# Patient Record
Sex: MALE | URBAN - METROPOLITAN AREA
[De-identification: names, ages, dates, MRNs, and addresses within clinical notes are randomized per-mention and may not be internally consistent; named-entity substitution may affect disease eponyms.]

---

## 2018-08-06 ENCOUNTER — APPOINTMENT (OUTPATIENT)
Age: 43
Setting detail: DERMATOLOGY
End: 2018-08-06

## 2018-08-06 DIAGNOSIS — D22 MELANOCYTIC NEVI: ICD-10-CM

## 2018-08-06 DIAGNOSIS — L93.0 DISCOID LUPUS ERYTHEMATOSUS: ICD-10-CM

## 2018-08-06 PROBLEM — D22.62 MELANOCYTIC NEVI OF LEFT UPPER LIMB, INCLUDING SHOULDER: Status: ACTIVE | Noted: 2018-08-06

## 2018-08-06 PROCEDURE — 99202 OFFICE O/P NEW SF 15 MIN: CPT | Mod: 25

## 2018-08-06 PROCEDURE — OTHER COUNSELING: OTHER

## 2018-08-06 PROCEDURE — OTHER PRESCRIPTION: OTHER

## 2018-08-06 PROCEDURE — OTHER MIPS QUALITY: OTHER

## 2018-08-06 PROCEDURE — OTHER BIOPSY BY PUNCH METHOD: OTHER

## 2018-08-06 PROCEDURE — 11100: CPT

## 2018-08-06 RX ORDER — TRIAMCINOLONE ACETONIDE 1 MG/G
APPLY CREAM TOPICAL BID
Qty: 1 | Refills: 3 | Status: ERX | COMMUNITY
Start: 2018-08-06

## 2018-08-06 ASSESSMENT — LOCATION DETAILED DESCRIPTION DERM
LOCATION DETAILED: LEFT POSTERIOR SHOULDER
LOCATION DETAILED: RIGHT MEDIAL SUPERIOR CHEST

## 2018-08-06 ASSESSMENT — LOCATION SIMPLE DESCRIPTION DERM
LOCATION SIMPLE: LEFT SHOULDER
LOCATION SIMPLE: CHEST

## 2018-08-06 ASSESSMENT — LOCATION ZONE DERM
LOCATION ZONE: TRUNK
LOCATION ZONE: ARM

## 2018-08-06 NOTE — PROCEDURE: BIOPSY BY PUNCH METHOD
Patient Will Remove Sutures At Home?: No
Hemostasis: None
Billing Type: Third-Party Bill
Home Suture Removal Text: Patient was provided a home suture removal kit and will remove their sutures at home.  If they have any questions or difficulties they will call the office.
Anesthesia Volume In Cc (Will Not Render If 0): 0.5
X Depth Of Punch In Cm (Optional): 0
Epidermal Sutures: 6-0 Ethilon
Notification Instructions: Patient will be notified of biopsy results. However, patient instructed to call the office if not contacted within 2 weeks.
Consent: Written consent was obtained and risks were reviewed including but not limited to scarring, infection, bleeding, scabbing, incomplete removal, nerve damage and allergy to anesthesia.
Dressing: bandage
Punch Size In Mm: 2
Detail Level: Detailed
Suture Removal: 14 days
Biopsy Type: H and E
Was A Bandage Applied: Yes
Anesthesia Type: 1% lidocaine with epinephrine
Post-Care Instructions: I reviewed with the patient in detail post-care instructions. Patient is to keep the biopsy site dry overnight, and then apply bacitracin twice daily until healed. Patient may apply hydrogen peroxide soaks to remove any crusting.
Wound Care: Petrolatum